# Patient Record
Sex: FEMALE | Race: BLACK OR AFRICAN AMERICAN | NOT HISPANIC OR LATINO | Employment: OTHER | URBAN - METROPOLITAN AREA
[De-identification: names, ages, dates, MRNs, and addresses within clinical notes are randomized per-mention and may not be internally consistent; named-entity substitution may affect disease eponyms.]

---

## 2021-07-13 ENCOUNTER — HOSPITAL ENCOUNTER (EMERGENCY)
Facility: HOSPITAL | Age: 61
Discharge: HOME | End: 2021-07-14
Attending: EMERGENCY MEDICINE
Payer: MEDICARE

## 2021-07-13 DIAGNOSIS — F23 ACUTE PSYCHOSIS: Primary | ICD-10-CM

## 2021-07-13 DIAGNOSIS — K59.00 CONSTIPATION: ICD-10-CM

## 2021-07-13 DIAGNOSIS — R45.850 HOMICIDAL IDEATIONS: ICD-10-CM

## 2021-07-13 LAB
ALBUMIN SERPL BCP-MCNC: 3.9 G/DL (ref 3.5–5.2)
ALP SERPL-CCNC: 82 U/L (ref 55–135)
ALT SERPL W/O P-5'-P-CCNC: 18 U/L (ref 10–44)
AMPHET+METHAMPHET UR QL: NEGATIVE
ANION GAP SERPL CALC-SCNC: 13 MMOL/L (ref 8–16)
APAP SERPL-MCNC: <3 UG/ML (ref 10–20)
AST SERPL-CCNC: 21 U/L (ref 10–40)
BARBITURATES UR QL SCN>200 NG/ML: NEGATIVE
BASOPHILS # BLD AUTO: 0.03 K/UL (ref 0–0.2)
BASOPHILS NFR BLD: 0.5 % (ref 0–1.9)
BENZODIAZ UR QL SCN>200 NG/ML: NEGATIVE
BILIRUB SERPL-MCNC: 0.3 MG/DL (ref 0.1–1)
BILIRUB UR QL STRIP: NEGATIVE
BUN SERPL-MCNC: 17 MG/DL (ref 8–23)
BZE UR QL SCN: NEGATIVE
CALCIUM SERPL-MCNC: 11 MG/DL (ref 8.7–10.5)
CANNABINOIDS UR QL SCN: NEGATIVE
CHLORIDE SERPL-SCNC: 101 MMOL/L (ref 95–110)
CLARITY UR: CLEAR
CO2 SERPL-SCNC: 26 MMOL/L (ref 23–29)
COLOR UR: YELLOW
CREAT SERPL-MCNC: 0.9 MG/DL (ref 0.5–1.4)
CREAT UR-MCNC: 83.5 MG/DL (ref 15–325)
DIFFERENTIAL METHOD: ABNORMAL
EOSINOPHIL # BLD AUTO: 0 K/UL (ref 0–0.5)
EOSINOPHIL NFR BLD: 0.2 % (ref 0–8)
ERYTHROCYTE [DISTWIDTH] IN BLOOD BY AUTOMATED COUNT: 13.1 % (ref 11.5–14.5)
EST. GFR  (AFRICAN AMERICAN): >60 ML/MIN/1.73 M^2
EST. GFR  (NON AFRICAN AMERICAN): >60 ML/MIN/1.73 M^2
ETHANOL SERPL-MCNC: <10 MG/DL
GLUCOSE SERPL-MCNC: 139 MG/DL (ref 70–110)
GLUCOSE UR QL STRIP: NEGATIVE
HCT VFR BLD AUTO: 41.4 % (ref 37–48.5)
HCV AB SERPL QL IA: NEGATIVE
HEP C VIRUS HOLD SPECIMEN: NORMAL
HGB BLD-MCNC: 13.6 G/DL (ref 12–16)
HGB UR QL STRIP: NEGATIVE
HIV 1+2 AB+HIV1 P24 AG SERPL QL IA: NEGATIVE
IMM GRANULOCYTES # BLD AUTO: 0.01 K/UL (ref 0–0.04)
IMM GRANULOCYTES NFR BLD AUTO: 0.2 % (ref 0–0.5)
KETONES UR QL STRIP: NEGATIVE
LEUKOCYTE ESTERASE UR QL STRIP: NEGATIVE
LYMPHOCYTES # BLD AUTO: 2.7 K/UL (ref 1–4.8)
LYMPHOCYTES NFR BLD: 48.4 % (ref 18–48)
MCH RBC QN AUTO: 28.6 PG (ref 27–31)
MCHC RBC AUTO-ENTMCNC: 32.9 G/DL (ref 32–36)
MCV RBC AUTO: 87 FL (ref 82–98)
METHADONE UR QL SCN>300 NG/ML: NEGATIVE
MONOCYTES # BLD AUTO: 0.6 K/UL (ref 0.3–1)
MONOCYTES NFR BLD: 10 % (ref 4–15)
NEUTROPHILS # BLD AUTO: 2.3 K/UL (ref 1.8–7.7)
NEUTROPHILS NFR BLD: 40.7 % (ref 38–73)
NITRITE UR QL STRIP: NEGATIVE
NRBC BLD-RTO: 0 /100 WBC
OPIATES UR QL SCN: NEGATIVE
PCP UR QL SCN>25 NG/ML: NEGATIVE
PH UR STRIP: 6 [PH] (ref 5–8)
PLATELET # BLD AUTO: 269 K/UL (ref 150–450)
PMV BLD AUTO: 9.7 FL (ref 9.2–12.9)
POTASSIUM SERPL-SCNC: 4.5 MMOL/L (ref 3.5–5.1)
PROT SERPL-MCNC: 8.9 G/DL (ref 6–8.4)
PROT UR QL STRIP: NEGATIVE
RBC # BLD AUTO: 4.75 M/UL (ref 4–5.4)
SODIUM SERPL-SCNC: 140 MMOL/L (ref 136–145)
SP GR UR STRIP: 1.02 (ref 1–1.03)
TOXICOLOGY INFORMATION: NORMAL
TSH SERPL DL<=0.005 MIU/L-ACNC: 1.72 UIU/ML (ref 0.4–4)
URN SPEC COLLECT METH UR: NORMAL
UROBILINOGEN UR STRIP-ACNC: NEGATIVE EU/DL
WBC # BLD AUTO: 5.62 K/UL (ref 3.9–12.7)

## 2021-07-13 PROCEDURE — 96372 THER/PROPH/DIAG INJ SC/IM: CPT

## 2021-07-13 PROCEDURE — 87389 HIV-1 AG W/HIV-1&-2 AB AG IA: CPT | Performed by: EMERGENCY MEDICINE

## 2021-07-13 PROCEDURE — 82077 ASSAY SPEC XCP UR&BREATH IA: CPT | Performed by: EMERGENCY MEDICINE

## 2021-07-13 PROCEDURE — 80307 DRUG TEST PRSMV CHEM ANLYZR: CPT | Performed by: EMERGENCY MEDICINE

## 2021-07-13 PROCEDURE — 80143 DRUG ASSAY ACETAMINOPHEN: CPT | Performed by: EMERGENCY MEDICINE

## 2021-07-13 PROCEDURE — 99285 EMERGENCY DEPT VISIT HI MDM: CPT | Mod: 25

## 2021-07-13 PROCEDURE — 63600175 PHARM REV CODE 636 W HCPCS: Performed by: EMERGENCY MEDICINE

## 2021-07-13 PROCEDURE — 80053 COMPREHEN METABOLIC PANEL: CPT | Performed by: EMERGENCY MEDICINE

## 2021-07-13 PROCEDURE — 81003 URINALYSIS AUTO W/O SCOPE: CPT | Mod: 59 | Performed by: EMERGENCY MEDICINE

## 2021-07-13 PROCEDURE — 25000003 PHARM REV CODE 250: Performed by: EMERGENCY MEDICINE

## 2021-07-13 PROCEDURE — 84443 ASSAY THYROID STIM HORMONE: CPT | Performed by: EMERGENCY MEDICINE

## 2021-07-13 PROCEDURE — 86803 HEPATITIS C AB TEST: CPT | Performed by: EMERGENCY MEDICINE

## 2021-07-13 PROCEDURE — 85025 COMPLETE CBC W/AUTO DIFF WBC: CPT | Performed by: EMERGENCY MEDICINE

## 2021-07-13 RX ORDER — DIVALPROEX SODIUM 500 MG/1
TABLET, FILM COATED, EXTENDED RELEASE ORAL
COMMUNITY
Start: 2021-06-09

## 2021-07-13 RX ORDER — ATORVASTATIN CALCIUM 40 MG/1
40 TABLET, FILM COATED ORAL DAILY
COMMUNITY
Start: 2021-06-17

## 2021-07-13 RX ORDER — METFORMIN HYDROCHLORIDE 1000 MG/1
1000 TABLET ORAL 2 TIMES DAILY
COMMUNITY
Start: 2021-07-08

## 2021-07-13 RX ORDER — ZIPRASIDONE MESYLATE 20 MG/ML
10 INJECTION, POWDER, LYOPHILIZED, FOR SOLUTION INTRAMUSCULAR
Status: COMPLETED | OUTPATIENT
Start: 2021-07-13 | End: 2021-07-13

## 2021-07-13 RX ORDER — HYDROXYZINE HYDROCHLORIDE 25 MG/1
25 TABLET, FILM COATED ORAL DAILY
COMMUNITY
Start: 2021-05-11

## 2021-07-13 RX ORDER — ONDANSETRON 4 MG/1
4 TABLET, ORALLY DISINTEGRATING ORAL
Status: COMPLETED | OUTPATIENT
Start: 2021-07-13 | End: 2021-07-13

## 2021-07-13 RX ORDER — HYDROCHLOROTHIAZIDE 12.5 MG/1
TABLET ORAL
COMMUNITY
Start: 2021-06-09

## 2021-07-13 RX ORDER — BLOOD-GLUCOSE METER
EACH MISCELLANEOUS
COMMUNITY
Start: 2021-05-13

## 2021-07-13 RX ORDER — METOCLOPRAMIDE 10 MG/1
TABLET ORAL
COMMUNITY
Start: 2021-04-13

## 2021-07-13 RX ORDER — TRIAMCINOLONE ACETONIDE 1 MG/G
CREAM TOPICAL
COMMUNITY
Start: 2021-05-11

## 2021-07-13 RX ORDER — AMLODIPINE BESYLATE 10 MG/1
TABLET ORAL
COMMUNITY
Start: 2021-07-03

## 2021-07-13 RX ORDER — FLUTICASONE PROPIONATE 50 MCG
SPRAY, SUSPENSION (ML) NASAL
COMMUNITY
Start: 2021-06-17

## 2021-07-13 RX ORDER — MONTELUKAST SODIUM 10 MG/1
10 TABLET ORAL DAILY
COMMUNITY
Start: 2021-06-14

## 2021-07-13 RX ORDER — CARVEDILOL 6.25 MG/1
6.25 TABLET ORAL 2 TIMES DAILY
COMMUNITY
Start: 2021-04-26

## 2021-07-13 RX ORDER — ALBUTEROL SULFATE 90 UG/1
AEROSOL, METERED RESPIRATORY (INHALATION)
COMMUNITY
Start: 2021-05-28

## 2021-07-13 RX ORDER — NAPROXEN SODIUM 220 MG/1
TABLET, FILM COATED ORAL
COMMUNITY

## 2021-07-13 RX ADMIN — ZIPRASIDONE MESYLATE 10 MG: 20 INJECTION, POWDER, LYOPHILIZED, FOR SOLUTION INTRAMUSCULAR at 06:07

## 2021-07-13 RX ADMIN — ONDANSETRON 4 MG: 4 TABLET, ORALLY DISINTEGRATING ORAL at 06:07

## 2021-07-14 VITALS
WEIGHT: 132.25 LBS | DIASTOLIC BLOOD PRESSURE: 70 MMHG | HEART RATE: 77 BPM | OXYGEN SATURATION: 98 % | TEMPERATURE: 98 F | SYSTOLIC BLOOD PRESSURE: 146 MMHG | RESPIRATION RATE: 16 BRPM

## 2023-05-26 ENCOUNTER — HOSPITAL ENCOUNTER (EMERGENCY)
Facility: HOSPITAL | Age: 63
Discharge: PSYCHIATRIC OR SUBSTANCE USE FACILITY | End: 2023-05-27
Attending: EMERGENCY MEDICINE
Payer: MEDICARE

## 2023-05-26 DIAGNOSIS — R10.31 RIGHT LOWER QUADRANT ABDOMINAL PAIN: ICD-10-CM

## 2023-05-26 DIAGNOSIS — R45.1 AGITATION: ICD-10-CM

## 2023-05-26 DIAGNOSIS — F45.8 DELUSION OF PREGNANCY: Primary | ICD-10-CM

## 2023-05-26 LAB
ALBUMIN SERPL BCP-MCNC: 4.4 G/DL (ref 3.5–5.2)
ALP SERPL-CCNC: 108 U/L (ref 55–135)
ALT SERPL W/O P-5'-P-CCNC: 19 U/L (ref 10–44)
AMPHET+METHAMPHET UR QL: NEGATIVE
ANION GAP SERPL CALC-SCNC: 15 MMOL/L (ref 8–16)
APAP SERPL-MCNC: <3 UG/ML (ref 10–20)
AST SERPL-CCNC: 27 U/L (ref 10–40)
B-HCG UR QL: NEGATIVE
BARBITURATES UR QL SCN>200 NG/ML: NEGATIVE
BASOPHILS # BLD AUTO: 0.04 K/UL (ref 0–0.2)
BASOPHILS NFR BLD: 0.6 % (ref 0–1.9)
BENZODIAZ UR QL SCN>200 NG/ML: NEGATIVE
BILIRUB SERPL-MCNC: 0.4 MG/DL (ref 0.1–1)
BUN SERPL-MCNC: 22 MG/DL (ref 8–23)
BZE UR QL SCN: NEGATIVE
CALCIUM SERPL-MCNC: 9.9 MG/DL (ref 8.7–10.5)
CANNABINOIDS UR QL SCN: NEGATIVE
CHLORIDE SERPL-SCNC: 106 MMOL/L (ref 95–110)
CO2 SERPL-SCNC: 23 MMOL/L (ref 23–29)
CREAT SERPL-MCNC: 1.1 MG/DL (ref 0.5–1.4)
CREAT UR-MCNC: 435.9 MG/DL (ref 15–325)
DIFFERENTIAL METHOD: NORMAL
EOSINOPHIL # BLD AUTO: 0 K/UL (ref 0–0.5)
EOSINOPHIL NFR BLD: 0.3 % (ref 0–8)
ERYTHROCYTE [DISTWIDTH] IN BLOOD BY AUTOMATED COUNT: 13.1 % (ref 11.5–14.5)
EST. GFR  (NO RACE VARIABLE): 56 ML/MIN/1.73 M^2
ETHANOL SERPL-MCNC: <10 MG/DL
GLUCOSE SERPL-MCNC: 93 MG/DL (ref 70–110)
HCT VFR BLD AUTO: 44 % (ref 37–48.5)
HCV AB SERPL QL IA: NEGATIVE
HEP C VIRUS HOLD SPECIMEN: NORMAL
HGB BLD-MCNC: 14.3 G/DL (ref 12–16)
HIV 1+2 AB+HIV1 P24 AG SERPL QL IA: NEGATIVE
IMM GRANULOCYTES # BLD AUTO: 0.02 K/UL (ref 0–0.04)
IMM GRANULOCYTES NFR BLD AUTO: 0.3 % (ref 0–0.5)
LIPASE SERPL-CCNC: 22 U/L (ref 4–60)
LYMPHOCYTES # BLD AUTO: 2.4 K/UL (ref 1–4.8)
LYMPHOCYTES NFR BLD: 34.3 % (ref 18–48)
MCH RBC QN AUTO: 27.6 PG (ref 27–31)
MCHC RBC AUTO-ENTMCNC: 32.5 G/DL (ref 32–36)
MCV RBC AUTO: 85 FL (ref 82–98)
METHADONE UR QL SCN>300 NG/ML: NEGATIVE
MONOCYTES # BLD AUTO: 0.6 K/UL (ref 0.3–1)
MONOCYTES NFR BLD: 8.8 % (ref 4–15)
NEUTROPHILS # BLD AUTO: 3.9 K/UL (ref 1.8–7.7)
NEUTROPHILS NFR BLD: 55.7 % (ref 38–73)
NRBC BLD-RTO: 0 /100 WBC
OPIATES UR QL SCN: NEGATIVE
PCP UR QL SCN>25 NG/ML: NEGATIVE
PLATELET # BLD AUTO: 260 K/UL (ref 150–450)
PMV BLD AUTO: 10.1 FL (ref 9.2–12.9)
POTASSIUM SERPL-SCNC: 3.5 MMOL/L (ref 3.5–5.1)
PROT SERPL-MCNC: 8.4 G/DL (ref 6–8.4)
RBC # BLD AUTO: 5.18 M/UL (ref 4–5.4)
SARS-COV-2 RDRP RESP QL NAA+PROBE: NEGATIVE
SODIUM SERPL-SCNC: 144 MMOL/L (ref 136–145)
TOXICOLOGY INFORMATION: ABNORMAL
TSH SERPL DL<=0.005 MIU/L-ACNC: 1.11 UIU/ML (ref 0.4–4)
WBC # BLD AUTO: 6.93 K/UL (ref 3.9–12.7)

## 2023-05-26 PROCEDURE — 80143 DRUG ASSAY ACETAMINOPHEN: CPT | Performed by: EMERGENCY MEDICINE

## 2023-05-26 PROCEDURE — 82077 ASSAY SPEC XCP UR&BREATH IA: CPT | Performed by: EMERGENCY MEDICINE

## 2023-05-26 PROCEDURE — 81000 URINALYSIS NONAUTO W/SCOPE: CPT | Mod: 59 | Performed by: EMERGENCY MEDICINE

## 2023-05-26 PROCEDURE — 87389 HIV-1 AG W/HIV-1&-2 AB AG IA: CPT | Performed by: EMERGENCY MEDICINE

## 2023-05-26 PROCEDURE — 25500020 PHARM REV CODE 255: Performed by: EMERGENCY MEDICINE

## 2023-05-26 PROCEDURE — 84443 ASSAY THYROID STIM HORMONE: CPT | Performed by: EMERGENCY MEDICINE

## 2023-05-26 PROCEDURE — 80053 COMPREHEN METABOLIC PANEL: CPT | Performed by: EMERGENCY MEDICINE

## 2023-05-26 PROCEDURE — 85025 COMPLETE CBC W/AUTO DIFF WBC: CPT | Performed by: EMERGENCY MEDICINE

## 2023-05-26 PROCEDURE — U0002 COVID-19 LAB TEST NON-CDC: HCPCS | Performed by: EMERGENCY MEDICINE

## 2023-05-26 PROCEDURE — 99285 EMERGENCY DEPT VISIT HI MDM: CPT | Mod: 25

## 2023-05-26 PROCEDURE — 25000003 PHARM REV CODE 250: Performed by: EMERGENCY MEDICINE

## 2023-05-26 PROCEDURE — 83690 ASSAY OF LIPASE: CPT | Performed by: EMERGENCY MEDICINE

## 2023-05-26 PROCEDURE — 86803 HEPATITIS C AB TEST: CPT | Performed by: EMERGENCY MEDICINE

## 2023-05-26 PROCEDURE — 81025 URINE PREGNANCY TEST: CPT | Performed by: EMERGENCY MEDICINE

## 2023-05-26 PROCEDURE — 80307 DRUG TEST PRSMV CHEM ANLYZR: CPT | Performed by: EMERGENCY MEDICINE

## 2023-05-26 RX ORDER — OLANZAPINE 5 MG/1
10 TABLET, ORALLY DISINTEGRATING ORAL
Status: COMPLETED | OUTPATIENT
Start: 2023-05-26 | End: 2023-05-26

## 2023-05-26 RX ADMIN — IOHEXOL 100 ML: 350 INJECTION, SOLUTION INTRAVENOUS at 09:05

## 2023-05-26 RX ADMIN — OLANZAPINE 10 MG: 5 TABLET, ORALLY DISINTEGRATING ORAL at 08:05

## 2023-05-27 VITALS
DIASTOLIC BLOOD PRESSURE: 77 MMHG | HEART RATE: 81 BPM | RESPIRATION RATE: 18 BRPM | HEIGHT: 59 IN | BODY MASS INDEX: 26.72 KG/M2 | TEMPERATURE: 99 F | OXYGEN SATURATION: 97 % | SYSTOLIC BLOOD PRESSURE: 135 MMHG

## 2023-05-27 LAB
BACTERIA #/AREA URNS HPF: ABNORMAL /HPF
BILIRUB UR QL STRIP: ABNORMAL
CLARITY UR: CLEAR
COLOR UR: YELLOW
GLUCOSE UR QL STRIP: ABNORMAL
HGB UR QL STRIP: NEGATIVE
HYALINE CASTS #/AREA URNS LPF: 2 /LPF
KETONES UR QL STRIP: ABNORMAL
LEUKOCYTE ESTERASE UR QL STRIP: NEGATIVE
MICROSCOPIC COMMENT: ABNORMAL
NITRITE UR QL STRIP: NEGATIVE
PH UR STRIP: 6 [PH] (ref 5–8)
PROT UR QL STRIP: ABNORMAL
RBC #/AREA URNS HPF: 1 /HPF (ref 0–4)
SP GR UR STRIP: >1.03 (ref 1–1.03)
SQUAMOUS #/AREA URNS HPF: 1 /HPF
URN SPEC COLLECT METH UR: ABNORMAL
UROBILINOGEN UR STRIP-ACNC: ABNORMAL EU/DL
WBC #/AREA URNS HPF: 1 /HPF (ref 0–5)

## 2023-05-27 PROCEDURE — 25000003 PHARM REV CODE 250: Performed by: EMERGENCY MEDICINE

## 2023-05-27 PROCEDURE — 63600175 PHARM REV CODE 636 W HCPCS: Performed by: EMERGENCY MEDICINE

## 2023-05-27 PROCEDURE — 96372 THER/PROPH/DIAG INJ SC/IM: CPT | Performed by: EMERGENCY MEDICINE

## 2023-05-27 RX ORDER — LORAZEPAM 2 MG/ML
1 INJECTION INTRAMUSCULAR
Status: COMPLETED | OUTPATIENT
Start: 2023-05-27 | End: 2023-05-27

## 2023-05-27 RX ORDER — OLANZAPINE 5 MG/1
5 TABLET, ORALLY DISINTEGRATING ORAL
Status: COMPLETED | OUTPATIENT
Start: 2023-05-27 | End: 2023-05-27

## 2023-05-27 RX ORDER — ZIPRASIDONE MESYLATE 20 MG/ML
20 INJECTION, POWDER, LYOPHILIZED, FOR SOLUTION INTRAMUSCULAR
Status: COMPLETED | OUTPATIENT
Start: 2023-05-27 | End: 2023-05-27

## 2023-05-27 RX ORDER — DIPHENHYDRAMINE HYDROCHLORIDE 50 MG/ML
25 INJECTION INTRAMUSCULAR; INTRAVENOUS
Status: DISCONTINUED | OUTPATIENT
Start: 2023-05-27 | End: 2023-05-27 | Stop reason: HOSPADM

## 2023-05-27 RX ORDER — LORAZEPAM 2 MG/ML
2 INJECTION INTRAMUSCULAR
Status: DISCONTINUED | OUTPATIENT
Start: 2023-05-27 | End: 2023-05-27 | Stop reason: HOSPADM

## 2023-05-27 RX ADMIN — OLANZAPINE 5 MG: 5 TABLET, ORALLY DISINTEGRATING ORAL at 04:05

## 2023-05-27 RX ADMIN — LORAZEPAM 1 MG: 2 INJECTION INTRAMUSCULAR; INTRAVENOUS at 12:05

## 2023-05-27 RX ADMIN — ZIPRASIDONE MESYLATE 20 MG: 20 INJECTION, POWDER, LYOPHILIZED, FOR SOLUTION INTRAMUSCULAR at 06:05

## 2023-05-27 NOTE — ED NOTES
Bed: 21  Expected date: 5/26/23  Expected time: 7:50 PM  Means of arrival: Ambulance Service  Comments:
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
## 2024-05-29 ENCOUNTER — TELEPHONE (OUTPATIENT)
Dept: OBSTETRICS AND GYNECOLOGY | Facility: CLINIC | Age: 64
End: 2024-05-29
Payer: MEDICARE

## 2024-05-29 NOTE — TELEPHONE ENCOUNTER
Lvm for pt to return call to get appointment scheduled with our department.   Received referral for annual/STI testing.

## 2025-04-08 ENCOUNTER — OFFICE VISIT (OUTPATIENT)
Dept: SPORTS MEDICINE | Facility: CLINIC | Age: 65
End: 2025-04-08
Payer: MEDICARE

## 2025-04-08 ENCOUNTER — HOSPITAL ENCOUNTER (OUTPATIENT)
Dept: RADIOLOGY | Facility: HOSPITAL | Age: 65
Discharge: HOME | End: 2025-04-08
Attending: ORTHOPAEDIC SURGERY
Payer: MEDICARE

## 2025-04-08 DIAGNOSIS — G56.03 BILATERAL CARPAL TUNNEL SYNDROME: ICD-10-CM

## 2025-04-08 DIAGNOSIS — M67.432 GANGLION CYST OF DORSUM OF LEFT WRIST: ICD-10-CM

## 2025-04-08 DIAGNOSIS — M25.532 LEFT WRIST PAIN: Primary | ICD-10-CM

## 2025-04-08 DIAGNOSIS — M25.532 LEFT WRIST PAIN: ICD-10-CM

## 2025-04-08 DIAGNOSIS — M25.832 ULNAR IMPACTION SYNDROME, LEFT: ICD-10-CM

## 2025-04-08 PROCEDURE — 73110 X-RAY EXAM OF WRIST: CPT | Mod: 26,LT,, | Performed by: RADIOLOGY

## 2025-04-08 PROCEDURE — 73110 X-RAY EXAM OF WRIST: CPT | Mod: TC,PN,LT

## 2025-04-08 PROCEDURE — 99999 PR PBB SHADOW E&M-EST. PATIENT-LVL II: CPT | Mod: PBBFAC,,, | Performed by: ORTHOPAEDIC SURGERY

## 2025-04-08 RX ORDER — BETAMETHASONE SODIUM PHOSPHATE AND BETAMETHASONE ACETATE 3; 3 MG/ML; MG/ML
6 INJECTION, SUSPENSION INTRA-ARTICULAR; INTRALESIONAL; INTRAMUSCULAR; SOFT TISSUE
Status: DISCONTINUED | OUTPATIENT
Start: 2025-04-08 | End: 2025-04-08 | Stop reason: HOSPADM

## 2025-04-08 RX ADMIN — BETAMETHASONE SODIUM PHOSPHATE AND BETAMETHASONE ACETATE 6 MG: 3; 3 INJECTION, SUSPENSION INTRA-ARTICULAR; INTRALESIONAL; INTRAMUSCULAR; SOFT TISSUE at 11:04

## 2025-04-08 NOTE — ASSESSMENT & PLAN NOTE
The patient and I talked at length about the natural history and pathophysiology of carpal tunnel, she understands that this is a chronic problem which may have acute episodic exacerbations.   Symptoms may resolve, worsen and even become permanent.  The patient understands the treatment options including observation, activity modification, therapy, NSAIDs, splints, injections and the surgical options including carpal tunnel release.  We discussed the risks of the diagnosis and the treatment options including pain, infection, bleeding, damage to nerves and vessels, stiffness, scarring, incomplete relief or recurrence of symptoms, poor pain and functional outcomes.  Unique risks of this diagnosis and the treatment include recurrence and incomplete relief of symptoms.  The patients treatment is further complicated by an increased risk of stiffness, wound healing complications and infection due to diabetes and an elevation of blood glucose levels in diabetic patients after steroid injections.  The patient has elected to proceed with night splints and bilateral carpal tunnel injections and we will follow up in 3 months.

## 2025-04-08 NOTE — ASSESSMENT & PLAN NOTE
The patient and I talked at length about the natural history and pathophysiology of left dorsal carpal ganglion cyst, she understands that this is a chronic problem which may have acute episodic exacerbations.   Symptoms may resolve, worsen and even become permanent.  The patient understands the treatment options including observation, activity modification, therapy, NSAIDs, splints, injections and the surgical options including excision.  We discussed the risks of the diagnosis and the treatment options including pain, infection, bleeding, damage to nerves and vessels, stiffness, scarring, incomplete relief or recurrence of symptoms, poor pain and functional outcomes.  Unique risks of this diagnosis and the treatment include recurrence.  The patients treatment is further complicated by an increased risk of stiffness, wound healing complications and infection due to diabetes.  The patient has elected to proceed with observation and bracing and we will follow up 3 months.

## 2025-04-08 NOTE — ASSESSMENT & PLAN NOTE
The patient and I talked at length about the natural history and pathophysiology of left ulnar impaction syndrome, she understands that this is a chronic problem which may have acute episodic exacerbations.   Symptoms may resolve, worsen and even become permanent.  The patient understands the treatment options including observation, activity modification, therapy, NSAIDs, splints, injections and the surgical options including ulnar shortening osteotomy.  We discussed the risks of the diagnosis and the treatment options including pain, infection, bleeding, damage to nerves and vessels, stiffness, scarring, incomplete relief or recurrence of symptoms, poor pain and functional outcomes.  Unique risks of this diagnosis and the treatment include nonunion.  The patients treatment is further complicated by an increased risk of stiffness, wound healing complications and infection due to diabetes.  The patient has elected to proceed with wrist widget splint and we will follow up three-month.

## 2025-04-08 NOTE — PROGRESS NOTES
LYNDA Holden M.D.  Orthopaedic Hand and Wrist Surgery  [facility]    Patient ID: Linda Gonzalez  YOB: 1960  MRN: [id number]    Provider Note/Medical Decision Makin. Left wrist pain  -     X-Ray Wrist Complete Left; Future; Expected date: 2025  -     Carpal Tunnel: L carpal tunnel    2. Bilateral carpal tunnel syndrome  Assessment & Plan:  The patient and I talked at length about the natural history and pathophysiology of carpal tunnel, she understands that this is a chronic problem which may have acute episodic exacerbations.   Symptoms may resolve, worsen and even become permanent.  The patient understands the treatment options including observation, activity modification, therapy, NSAIDs, splints, injections and the surgical options including carpal tunnel release.  We discussed the risks of the diagnosis and the treatment options including pain, infection, bleeding, damage to nerves and vessels, stiffness, scarring, incomplete relief or recurrence of symptoms, poor pain and functional outcomes.  Unique risks of this diagnosis and the treatment include recurrence and incomplete relief of symptoms.  The patients treatment is further complicated by an increased risk of stiffness, wound healing complications and infection due to diabetes and an elevation of blood glucose levels in diabetic patients after steroid injections.  The patient has elected to proceed with night splints and bilateral carpal tunnel injections and we will follow up in 3 months.      Orders:  -     Carpal Tunnel: R carpal tunnel    3. Ganglion cyst of dorsum of left wrist  Assessment & Plan:  The patient and I talked at length about the natural history and pathophysiology of left dorsal carpal ganglion cyst, she understands that this is a chronic problem which may have acute episodic exacerbations.   Symptoms may resolve, worsen and even become permanent.  The patient understands the treatment  options including observation, activity modification, therapy, NSAIDs, splints, injections and the surgical options including excision.  We discussed the risks of the diagnosis and the treatment options including pain, infection, bleeding, damage to nerves and vessels, stiffness, scarring, incomplete relief or recurrence of symptoms, poor pain and functional outcomes.  Unique risks of this diagnosis and the treatment include recurrence.  The patients treatment is further complicated by an increased risk of stiffness, wound healing complications and infection due to diabetes.  The patient has elected to proceed with observation and bracing and we will follow up 3 months.        4. Ulnar impaction syndrome, left  Assessment & Plan:  The patient and I talked at length about the natural history and pathophysiology of left ulnar impaction syndrome, she understands that this is a chronic problem which may have acute episodic exacerbations.   Symptoms may resolve, worsen and even become permanent.  The patient understands the treatment options including observation, activity modification, therapy, NSAIDs, splints, injections and the surgical options including ulnar shortening osteotomy.  We discussed the risks of the diagnosis and the treatment options including pain, infection, bleeding, damage to nerves and vessels, stiffness, scarring, incomplete relief or recurrence of symptoms, poor pain and functional outcomes.  Unique risks of this diagnosis and the treatment include nonunion.  The patients treatment is further complicated by an increased risk of stiffness, wound healing complications and infection due to diabetes.  The patient has elected to proceed with wrist widget splint and we will follow up three-month.             Chief Complaint: Pain of the Left Wrist      Referred By: Self,Aaareferral    History of Present Illness: Linda Gonzalez is a 64 y.o. female with a several year history of bilateral hand numbness  "and tingling I have seen her previously she did have an EMG in  which confirmed bilateral carpal tunnel syndrome.  She also has a 5 day history of left dorsal wrist mass and a several week history of left ulnar-sided wrist pain with activity.  She denies any injury.      Patient was queried and this is the extent of the patients current complaints today.    Past Medical History:     Estimated body mass index is 26.72 kg/m² as calculated from the following:    Height as of 23: 4' 11" (1.499 m).    Weight as of 21: 60 kg (132 lb 4.4 oz).  Past Medical History:   Diagnosis Date    Bipolar 1 disorder     Schizophrenia      No past surgical history on file.  No family history on file.  Social History[1]  Medication List with Changes/Refills   Current Medications    ACCU-CHEK GUIDE ME GLUCOSE MTR MISC        ALBUTEROL (PROVENTIL/VENTOLIN HFA) 90 MCG/ACTUATION INHALER    Inhale into the lungs.    AMLODIPINE (NORVASC) 10 MG TABLET        ASPIRIN 81 MG CHEW    CHEW THEN SWALLOW 1 TABLET BY MOUTH DAILY    ATORVASTATIN (LIPITOR) 40 MG TABLET    Take 40 mg by mouth once daily.    CARVEDILOL (COREG) 6.25 MG TABLET    Take 6.25 mg by mouth 2 (two) times daily.    DIVALPROEX ER (DEPAKOTE) 500 MG TB24        FLUTICASONE PROPIONATE (FLONASE) 50 MCG/ACTUATION NASAL SPRAY    by Each Nostril route.    HYDROCHLOROTHIAZIDE (HYDRODIURIL) 12.5 MG TAB        HYDROXYZINE HCL (ATARAX) 25 MG TABLET    Take 25 mg by mouth once daily.    METFORMIN (GLUCOPHAGE) 1000 MG TABLET    Take 1,000 mg by mouth 2 (two) times daily.    METOCLOPRAMIDE HCL (REGLAN) 10 MG TABLET    Take by mouth.    MONTELUKAST (SINGULAIR) 10 MG TABLET    Take 10 mg by mouth once daily.    TRIAMCINOLONE ACETONIDE 0.1% (KENALOG) 0.1 % CREAM    SMARTSI Application Topical 2-3 Times Daily     Review of patient's allergies indicates:   Allergen Reactions    Carbamazepine Hives     Other reaction(s): Sensitivity: AL;      Fluphenazine hcl Hives     Other " reaction(s): Sensitivity: AL;      Haloperidol lactate Hives     Other reaction(s): Sensitivity: AL;      Lithium analogues Hives     Other reaction(s): Sensitivity: AL;      Milk containing products (dairy) Diarrhea     Other reaction(s): Unknown      Lurasidone Hives and Rash     ROS    Physical Exam:   GENERAL: Well appearing, appropriate for stated age, no acute distress.  CARDIOVASCULAR:  Fingers have good brisk refill and good turgor.   PULMONARY: Respirations are even and non-labored.  NEURO: Awake, alert, and oriented x 3.  PSYCH: Mood & affect are appropriate.              Right Hand/Wrist Exam     Range of Motion   The patient has normal right hand/wrist ROM.    Tests   Tinel's sign (median nerve): positive  Carpal Tunnel Compression Test: positive        Left Hand/Wrist Exam     Tests   Carpal Tunnel Compression Test: positive            Hand/Wrist Musculoskeletal Exam    Inspection    Right      Right hand/wrist inspection is normal.    Left      Left hand/wrist inspection is normal.    Palpation    Right      Right hand palpation is normal.    Left       Left hand palpation is normal.    Palpation additional comments: There is a left dorsal wrist mass consistent with a ganglion cyst  Mild tenderness over the TFC  No tenderness over the ECU or FCU    Range of Motion      Right Wrist      Right wrist range of motion is normal.       Strength    Right Hand      Abductor pollicis brevis: 5/5.       1st dorsal interossei abduction: 5/5.      Left Hand      Abductor pollicis brevis: 5/5.       1st dorsal interossei abduction: 5/5.        Neurovascular    Right       Ulnar nerve sensory distribution: normal      Median nerve sensory distribution: decreased      Superficial radial nerve sensory distribution: normal    Left       Ulnar nerve sensory distribution: normal      Median nerve sensory distribution: decreased      Superficial radial nerve sensory distribution: normal    Special Tests    Right       Carpal compression test: positive      Elbow flexion: negative      Tinel's - carpal tunnel: positive    Left      Carpal compression test: positive    Full range of motion of both hands  Pain over the TFC      Imaging:    Relevant imaging results reviewed and interpreted by me, discussed with the patient and / or family today.   X-rays reviewed which showed evidence of ulnar positivity and sclerosis with cystic formation over the proximal aspect of the lunate along the ulnar side consistent with ulnar impaction syndrome      Provider Note/Medical Decision Makin. Left wrist pain  -     X-Ray Wrist Complete Left; Future; Expected date: 2025  -     Carpal Tunnel: L carpal tunnel    2. Bilateral carpal tunnel syndrome  Assessment & Plan:  The patient and I talked at length about the natural history and pathophysiology of carpal tunnel, she understands that this is a chronic problem which may have acute episodic exacerbations.   Symptoms may resolve, worsen and even become permanent.  The patient understands the treatment options including observation, activity modification, therapy, NSAIDs, splints, injections and the surgical options including carpal tunnel release.  We discussed the risks of the diagnosis and the treatment options including pain, infection, bleeding, damage to nerves and vessels, stiffness, scarring, incomplete relief or recurrence of symptoms, poor pain and functional outcomes.  Unique risks of this diagnosis and the treatment include recurrence and incomplete relief of symptoms.  The patients treatment is further complicated by an increased risk of stiffness, wound healing complications and infection due to diabetes and an elevation of blood glucose levels in diabetic patients after steroid injections.  The patient has elected to proceed with night splints and bilateral carpal tunnel injections and we will follow up in 3 months.      Orders:  -     Carpal Tunnel: R carpal tunnel    3.  Ganglion cyst of dorsum of left wrist  Assessment & Plan:  The patient and I talked at length about the natural history and pathophysiology of left dorsal carpal ganglion cyst, she understands that this is a chronic problem which may have acute episodic exacerbations.   Symptoms may resolve, worsen and even become permanent.  The patient understands the treatment options including observation, activity modification, therapy, NSAIDs, splints, injections and the surgical options including excision.  We discussed the risks of the diagnosis and the treatment options including pain, infection, bleeding, damage to nerves and vessels, stiffness, scarring, incomplete relief or recurrence of symptoms, poor pain and functional outcomes.  Unique risks of this diagnosis and the treatment include recurrence.  The patients treatment is further complicated by an increased risk of stiffness, wound healing complications and infection due to diabetes.  The patient has elected to proceed with observation and bracing and we will follow up 3 months.        4. Ulnar impaction syndrome, left  Assessment & Plan:  The patient and I talked at length about the natural history and pathophysiology of left ulnar impaction syndrome, she understands that this is a chronic problem which may have acute episodic exacerbations.   Symptoms may resolve, worsen and even become permanent.  The patient understands the treatment options including observation, activity modification, therapy, NSAIDs, splints, injections and the surgical options including ulnar shortening osteotomy.  We discussed the risks of the diagnosis and the treatment options including pain, infection, bleeding, damage to nerves and vessels, stiffness, scarring, incomplete relief or recurrence of symptoms, poor pain and functional outcomes.  Unique risks of this diagnosis and the treatment include nonunion.  The patients treatment is further complicated by an increased risk of stiffness,  wound healing complications and infection due to diabetes.  The patient has elected to proceed with wrist widget splint and we will follow up three-month.             I discussed worrisome and red flag signs and symptoms with the patient. The patient expressed understanding and agreed to alert me immediately or to go to the emergency room if they experience any of these.   Treatment plan was developed with input from the patient/family, and they expressed understanding and agreement with the plan. All questions were answered today.    There are no Patient Instructions on file for this visit.    LYNDA Holden M.D.  [facility]  Orthopedic Hand and Wrist Surgeon    [facility]  Dr. Wale Holden   [facility]     Disclaimer: This note was prepared using a voice recognition system and is likely to have sound alike errors within the text.            [1]   Social History  Socioeconomic History    Marital status: Legally    Tobacco Use    Smoking status: Never    Smokeless tobacco: Never   Substance and Sexual Activity    Alcohol use: Not Currently    Drug use: Never    Sexual activity: Not Currently     Social Drivers of Health     Financial Resource Strain: High Risk (10/15/2024)    Received from [facility] SDOH Screening     In the past year, have you been unable to get any of the following when you really needed them? choose all that apply.: Medicine or health care     In the past year, have you been unable to get any of the following when you really needed them? choose all that apply.: Internet   Food Insecurity: No Food Insecurity (3/7/2024)    Received from [facility] and Its Subsidiaries and Affiliates    Hunger Vital Sign     Worried About Running Out of Food in the Last Year: Never true     Ran Out of Food in the Last Year: Never true   Transportation Needs: High Risk  (10/15/2024)    Received from [facility] SDOH Screening     Has lack of transportation kept you from medical appointments, meetings, work or from getting things needed for daily living? choose all that apply.: Yes, it has kept me from non-medical meetings, appointments, work or from getting things that i need   Stress: Patient Declined (1/19/2024)    Received from [facility] and Its Subsidiaries and Affiliates    [employer] [place] [employer] - Occupational Stress Questionnaire     Feeling of Stress : Patient declined   Housing Stability: Low Risk  (3/7/2024)    Received from [facility] and Its Subsidiaries and Affiliates    Housing Stability Vital Sign     Unable to Pay for Housing in the Last Year: No     Number of Places Lived in the Last Year: 1     Unstable Housing in the Last Year: No

## 2025-04-08 NOTE — PROCEDURES
Carpal Tunnel: R carpal tunnel    Date/Time: 4/8/2025 11:15 AM    Performed by: Alhaji Holden MD  Authorized by: Alhaji Holden MD    Consent Done?:  Yes (Verbal)  Indications:  Pain  Site marked: the procedure site was marked    Timeout: prior to procedure the correct patient, procedure, and site was verified    Prep: patient was prepped and draped in usual sterile fashion      Local anesthesia used?: Yes    Local anesthetic:  Lidocaine 1% without epinephrine  Anesthetic total (ml):  1    Location:  Wrist  Site:  R carpal tunnel  Ultrasonic Guidance for Needle Placement?: No    Approach:  Volar  Medications:  6 mg betamethasone acetate-betamethasone sodium phosphate 6 mg/mL  Patient tolerance:  Patient tolerated the procedure well with no immediate complications     Additional Comments: I have explained the risks, benefits, and alternatives of the procedure in detail.  The patient voices understanding and all questions have been answered.  The patient agrees to proceed as planned. After sterile prep the right Carpal Tunnel was injected while being aware of the relevant neurovascular structures with 6mg celestone and 1mL of 1% lidocaine with a 27g needle. The patient tolerated the injection well. The patient understands that immediate relief of pain is secondary to the local anesthetic and will be temporary.  After the anesthetic wears off there may be a increase in pain that may last for a few hours or a few days and they should use ice to help alleviate this flair up of pain.

## 2025-04-08 NOTE — PROCEDURES
Carpal Tunnel: L carpal tunnel    Date/Time: 4/8/2025 11:15 AM    Performed by: Alhaji Holden MD  Authorized by: Alhaji Holden MD    Consent Done?:  Yes (Verbal)  Indications:  Pain  Site marked: the procedure site was marked    Timeout: prior to procedure the correct patient, procedure, and site was verified    Prep: patient was prepped and draped in usual sterile fashion      Local anesthesia used?: Yes    Local anesthetic:  Lidocaine 1% without epinephrine  Anesthetic total (ml):  1    Location:  Wrist  Site:  L carpal tunnel  Ultrasonic Guidance for Needle Placement?: No    Approach:  Volar  Medications:  6 mg betamethasone acetate-betamethasone sodium phosphate 6 mg/mL  Patient tolerance:  Patient tolerated the procedure well with no immediate complications     Additional Comments: I have explained the risks, benefits, and alternatives of the procedure in detail.  The patient voices understanding and all questions have been answered.  The patient agrees to proceed as planned. After sterile prep the left Carpal Tunnel was injected while being aware of the relevant neurovascular structures with 6mg celestone and 1mL of 1% lidocaine with a 27g needle. The patient tolerated the injection well. The patient understands that immediate relief of pain is secondary to the local anesthetic and will be temporary.  After the anesthetic wears off there may be a increase in pain that may last for a few hours or a few days and they should use ice to help alleviate this flair up of pain.

## 2025-06-09 ENCOUNTER — HOSPITAL ENCOUNTER (EMERGENCY)
Facility: HOSPITAL | Age: 65
Discharge: PSYCHIATRIC OR SUBSTANCE USE FACILITY | End: 2025-06-10
Attending: EMERGENCY MEDICINE
Payer: MEDICARE

## 2025-06-09 DIAGNOSIS — Z00.8 MEDICAL CLEARANCE FOR PSYCHIATRIC ADMISSION: ICD-10-CM

## 2025-06-09 DIAGNOSIS — F22 PARANOID DELUSION: Primary | ICD-10-CM

## 2025-06-09 LAB
ABSOLUTE EOSINOPHIL (OHS): 0.03 K/UL
ABSOLUTE MONOCYTE (OHS): 0.31 K/UL (ref 0.3–1)
ABSOLUTE NEUTROPHIL COUNT (OHS): 1.35 K/UL (ref 1.8–7.7)
ALBUMIN SERPL BCP-MCNC: 3.5 G/DL (ref 3.5–5.2)
ALP SERPL-CCNC: 92 UNIT/L (ref 40–150)
ALT SERPL W/O P-5'-P-CCNC: 12 UNIT/L (ref 10–44)
ANION GAP (OHS): 10 MMOL/L (ref 8–16)
APAP SERPL-MCNC: <3 UG/ML (ref 10–20)
AST SERPL-CCNC: 15 UNIT/L (ref 11–45)
BACTERIA #/AREA URNS AUTO: NORMAL /HPF
BASOPHILS # BLD AUTO: 0.01 K/UL
BASOPHILS NFR BLD AUTO: 0.3 %
BILIRUB SERPL-MCNC: 0.2 MG/DL (ref 0.1–1)
BILIRUB UR QL STRIP.AUTO: NEGATIVE
BUN SERPL-MCNC: 15 MG/DL (ref 8–23)
CALCIUM SERPL-MCNC: 9.2 MG/DL (ref 8.7–10.5)
CHLORIDE SERPL-SCNC: 105 MMOL/L (ref 95–110)
CLARITY UR: CLEAR
CO2 SERPL-SCNC: 22 MMOL/L (ref 23–29)
COLOR UR AUTO: YELLOW
CREAT SERPL-MCNC: 0.9 MG/DL (ref 0.5–1.4)
ERYTHROCYTE [DISTWIDTH] IN BLOOD BY AUTOMATED COUNT: 12.7 % (ref 11.5–14.5)
ETHANOL SERPL-MCNC: <10 MG/DL
GFR SERPLBLD CREATININE-BSD FMLA CKD-EPI: >60 ML/MIN/1.73/M2
GLUCOSE SERPL-MCNC: 93 MG/DL (ref 70–110)
GLUCOSE UR QL STRIP: NEGATIVE
HCT VFR BLD AUTO: 36.4 % (ref 37–48.5)
HCV AB SERPL QL IA: NEGATIVE
HGB BLD-MCNC: 12.3 GM/DL (ref 12–16)
HGB UR QL STRIP: NEGATIVE
HIV 1+2 AB+HIV1 P24 AG SERPL QL IA: NEGATIVE
HOLD SPECIMEN: NORMAL
HOLD SPECIMEN: NORMAL
HYALINE CASTS UR QL AUTO: 1 /LPF (ref 0–1)
IMM GRANULOCYTES # BLD AUTO: 0 K/UL (ref 0–0.04)
IMM GRANULOCYTES NFR BLD AUTO: 0 % (ref 0–0.5)
KETONES UR QL STRIP: NEGATIVE
LEUKOCYTE ESTERASE UR QL STRIP: ABNORMAL
LYMPHOCYTES # BLD AUTO: 1.54 K/UL (ref 1–4.8)
MCH RBC QN AUTO: 28.5 PG (ref 27–31)
MCHC RBC AUTO-ENTMCNC: 33.8 G/DL (ref 32–36)
MCV RBC AUTO: 84 FL (ref 82–98)
MICROSCOPIC COMMENT: NORMAL
NITRITE UR QL STRIP: NEGATIVE
NUCLEATED RBC (/100WBC) (OHS): 0 /100 WBC
PH UR STRIP: 7 [PH]
PLATELET # BLD AUTO: 212 K/UL (ref 150–450)
PMV BLD AUTO: 9.7 FL (ref 9.2–12.9)
POTASSIUM SERPL-SCNC: 4.1 MMOL/L (ref 3.5–5.1)
PROT SERPL-MCNC: 7.4 GM/DL (ref 6–8.4)
PROT UR QL STRIP: NEGATIVE
RBC # BLD AUTO: 4.32 M/UL (ref 4–5.4)
RBC #/AREA URNS AUTO: 0 /HPF (ref 0–4)
RELATIVE EOSINOPHIL (OHS): 0.9 %
RELATIVE LYMPHOCYTE (OHS): 47.5 % (ref 18–48)
RELATIVE MONOCYTE (OHS): 9.6 % (ref 4–15)
RELATIVE NEUTROPHIL (OHS): 41.7 % (ref 38–73)
SALICYLATES SERPL-MCNC: <5 MG/DL (ref 15–30)
SARS-COV-2 RDRP RESP QL NAA+PROBE: NEGATIVE
SODIUM SERPL-SCNC: 137 MMOL/L (ref 136–145)
SP GR UR STRIP: 1.02
TSH SERPL-ACNC: 1.34 UIU/ML (ref 0.4–4)
UROBILINOGEN UR STRIP-ACNC: NEGATIVE EU/DL
VALPROATE SERPL-MCNC: 75.7 UG/ML (ref 50–100)
WBC # BLD AUTO: 3.24 K/UL (ref 3.9–12.7)
WBC #/AREA URNS AUTO: 2 /HPF (ref 0–5)
YEAST UR QL AUTO: NORMAL /HPF

## 2025-06-09 PROCEDURE — 86803 HEPATITIS C AB TEST: CPT | Performed by: EMERGENCY MEDICINE

## 2025-06-09 PROCEDURE — 81001 URINALYSIS AUTO W/SCOPE: CPT | Performed by: EMERGENCY MEDICINE

## 2025-06-09 PROCEDURE — 85025 COMPLETE CBC W/AUTO DIFF WBC: CPT | Performed by: EMERGENCY MEDICINE

## 2025-06-09 PROCEDURE — 80179 DRUG ASSAY SALICYLATE: CPT | Performed by: EMERGENCY MEDICINE

## 2025-06-09 PROCEDURE — 82077 ASSAY SPEC XCP UR&BREATH IA: CPT | Performed by: EMERGENCY MEDICINE

## 2025-06-09 PROCEDURE — 80164 ASSAY DIPROPYLACETIC ACD TOT: CPT | Performed by: EMERGENCY MEDICINE

## 2025-06-09 PROCEDURE — U0002 COVID-19 LAB TEST NON-CDC: HCPCS | Performed by: EMERGENCY MEDICINE

## 2025-06-09 PROCEDURE — 84443 ASSAY THYROID STIM HORMONE: CPT | Performed by: EMERGENCY MEDICINE

## 2025-06-09 PROCEDURE — 80053 COMPREHEN METABOLIC PANEL: CPT | Performed by: EMERGENCY MEDICINE

## 2025-06-09 PROCEDURE — 87389 HIV-1 AG W/HIV-1&-2 AB AG IA: CPT | Performed by: EMERGENCY MEDICINE

## 2025-06-09 PROCEDURE — 80143 DRUG ASSAY ACETAMINOPHEN: CPT | Performed by: EMERGENCY MEDICINE

## 2025-06-09 PROCEDURE — 80307 DRUG TEST PRSMV CHEM ANLYZR: CPT | Performed by: EMERGENCY MEDICINE

## 2025-06-09 PROCEDURE — 99285 EMERGENCY DEPT VISIT HI MDM: CPT

## 2025-06-09 RX ORDER — ARIPIPRAZOLE 15 MG/1
30 TABLET ORAL
COMMUNITY

## 2025-06-09 RX ORDER — CLONAZEPAM 1 MG/1
1 TABLET ORAL
COMMUNITY

## 2025-06-09 RX ORDER — RISPERIDONE 4 MG/1
4 TABLET ORAL NIGHTLY
COMMUNITY
Start: 2025-01-07

## 2025-06-09 RX ORDER — CLONAZEPAM 0.5 MG/1
TABLET ORAL
COMMUNITY

## 2025-06-09 RX ORDER — BUSPIRONE HYDROCHLORIDE 10 MG/1
10 TABLET ORAL 2 TIMES DAILY
COMMUNITY
Start: 2025-05-14

## 2025-06-09 RX ORDER — TIOTROPIUM BROMIDE AND OLODATEROL 3.124; 2.736 UG/1; UG/1
SPRAY, METERED RESPIRATORY (INHALATION)
COMMUNITY
Start: 2025-04-27

## 2025-06-10 VITALS
OXYGEN SATURATION: 98 % | SYSTOLIC BLOOD PRESSURE: 137 MMHG | RESPIRATION RATE: 16 BRPM | HEIGHT: 60 IN | BODY MASS INDEX: 25.52 KG/M2 | DIASTOLIC BLOOD PRESSURE: 86 MMHG | HEART RATE: 76 BPM | TEMPERATURE: 98 F | WEIGHT: 130 LBS

## 2025-06-10 LAB
AMPHET UR QL SCN: NEGATIVE
BARBITURATE SCN PRESENT UR: NEGATIVE
BENZODIAZ UR QL SCN: NEGATIVE
CANNABINOIDS UR QL SCN: NEGATIVE
COCAINE UR QL SCN: NEGATIVE
CREAT UR-MCNC: 153.1 MG/DL (ref 15–325)
METHADONE UR QL SCN: NEGATIVE
OPIATES UR QL SCN: NEGATIVE
PCP UR QL: NEGATIVE

## 2025-06-10 PROCEDURE — 93010 ELECTROCARDIOGRAM REPORT: CPT | Mod: ,,, | Performed by: INTERNAL MEDICINE

## 2025-06-10 PROCEDURE — 93005 ELECTROCARDIOGRAM TRACING: CPT

## 2025-06-10 NOTE — ED PROVIDER NOTES
"SCRIBE #1 NOTE: I, Hung Hutchinson, am scribing for, and in the presence of, Lucrecia Covington MD. I have scribed the entire note.       History     Chief Complaint   Patient presents with    Paranoid     Per EMS pt was released from [facility].  Per the  the pt is paranoid.  Pt denies SI/HI.  Pt c/o chronic feet pain.     Review of patient's allergies indicates:   Allergen Reactions    Ace inhibitors     Benztropine      Other Reaction(s): tremors,drooling    Carbamazepine Hives     Other reaction(s): Sensitivity: AL;      Fluphenazine hcl Hives     Other reaction(s): Sensitivity: AL;      Haloperidol lactate Hives     Other reaction(s): Sensitivity: AL;      Lithium analogues Hives     Other reaction(s): Sensitivity: AL;      Milk containing products (dairy) Diarrhea     Other reaction(s): Unknown    Risperidone      Other reaction(s): Unknown    Trazodone      Other reaction(s): Unknown    Lurasidone Hives and Rash         History of Present Illness     HPI    6/9/2025, 9:12 PM  History obtained from the patient, medical records, and EMS      History of Present Illness: Linda Gonzalez is a 64 y.o. female patient with a PMHx of Schizophrenia and bipolar disorder who presents to the Emergency Department for evaluation of delusions which began earlier today. Per EMS, pt was recently released from [facility] earlier today. Pt states [facility] sent her to a group home that was "horrible." Pt reports the group home was dirty and contained captives, bugs, cameras in all the rooms and bathrooms, and sensors in the hallways. Pt states she walked out because the door was open, but the owner named EKTA drove her around to confuse her. Pt reports she somehow got to [employer] and called the police to tell them about the group home. Symptoms are constant and moderate in severity. No mitigating or exacerbating factors reported. Associated sxs include paranoia and abdominal pain. Patient denies any fever, " SI, AH, or VH. Pt reports she no longer talks to her family.  No prior Tx specified.  No further complaints or concerns at this time.       Arrival mode: EMS    PCP: Gerald Jaimes MD        Past Medical History:  Past Medical History:   Diagnosis Date    Bipolar 1 disorder     Schizophrenia        Past Surgical History:  History reviewed. No pertinent surgical history.      Family History:  No family history on file.    Social History:  Social History     Tobacco Use    Smoking status: Never    Smokeless tobacco: Never   Substance and Sexual Activity    Alcohol use: Not Currently    Drug use: Never    Sexual activity: Not Currently        Review of Systems     Review of Systems   Constitutional:  Negative for fever.   HENT:  Negative for sore throat.    Respiratory:  Negative for shortness of breath.    Cardiovascular:  Negative for chest pain.   Gastrointestinal:  Positive for abdominal pain. Negative for nausea.   Genitourinary:  Negative for dysuria.   Musculoskeletal:  Negative for back pain.   Skin:  Negative for rash.   Neurological:  Negative for weakness.   Hematological:  Does not bruise/bleed easily.   Psychiatric/Behavioral:  Negative for hallucinations and suicidal ideas.         (+) delusions  (+) paranoia   All other systems reviewed and are negative.       Physical Exam     Initial Vitals   BP Pulse Resp Temp SpO2   06/09/25 2103 06/09/25 2103 06/09/25 2103 06/09/25 2103 06/10/25 0230   125/76 110 16 98.5 °F (36.9 °C) 98 %      MAP       --                 Physical Exam  Nursing Notes and Vital Signs Reviewed.  Constitutional: Patient is in no apparent distress. Well-developed and well-nourished.  Head: Atraumatic. Normocephalic.  Eyes: PERRL. EOM intact. Conjunctivae are not pale. No scleral icterus.  ENT: Mucous membranes are moist. Oropharynx is clear and symmetric.    Neck: Supple. Full ROM. No lymphadenopathy.  Cardiovascular: Regular rate. Regular rhythm. No murmurs, rubs, or gallops. Distal  pulses are 2+ and symmetric.  Pulmonary/Chest: No respiratory distress. Clear to auscultation bilaterally. No wheezing or rales.  Abdominal: Soft and non-distended. There is no tenderness.  No rebound, guarding, or rigidity. Good bowel sounds.  Genitourinary: No CVA tenderness.  Musculoskeletal: Moves all extremities. No obvious deformities. No edema. No calf tenderness.  Skin: Warm and dry.  Neurological:  Alert, awake, and appropriate.  Normal speech.  No acute focal neurological deficits are appreciated.  Psychiatric: Normal affect. Good eye contact. Paranoid behavior. Rambling speech. Flight of ideas.     ED Course   Procedures  ED Vital Signs:  Vitals:    06/09/25 2103 06/10/25 0230   BP: 125/76 137/86   Pulse: 110 76   Resp: 16 16   Temp: 98.5 °F (36.9 °C) 98 °F (36.7 °C)   TempSrc: Oral    SpO2:  98%   Weight: 59 kg (130 lb)    Height: 5' (1.524 m)        Abnormal Lab Results:  Labs Reviewed   COMPREHENSIVE METABOLIC PANEL - Abnormal       Result Value    Sodium 137      Potassium 4.1      Chloride 105      CO2 22 (*)     Glucose 93      BUN 15      Creatinine 0.9      Calcium 9.2      Protein Total 7.4      Albumin 3.5      Bilirubin Total 0.2      ALP 92      AST 15      ALT 12      Anion Gap 10      eGFR >60     URINALYSIS, REFLEX TO URINE CULTURE - Abnormal    Color, UA Yellow      Appearance, UA Clear      pH, UA 7.0      Spec Grav UA 1.020      Protein, UA Negative      Glucose, UA Negative      Ketones, UA Negative      Bilirubin, UA Negative      Blood, UA Negative      Nitrites, UA Negative      Urobilinogen, UA Negative      Leukocyte Esterase, UA Trace (*)    ACETAMINOPHEN LEVEL - Abnormal    Acetaminophen Level <3.0 (*)    SALICYLATE LEVEL - Abnormal    Salicylate Level <5.0 (*)    CBC WITH DIFFERENTIAL - Abnormal    WBC 3.24 (*)     RBC 4.32      HGB 12.3      HCT 36.4 (*)     MCV 84      MCH 28.5      MCHC 33.8      RDW 12.7      Platelet Count 212      MPV 9.7      Nucleated RBC 0      Neut %  "41.7      Lymph % 47.5      Mono % 9.6      Eos % 0.9      Basophil % 0.3      Imm Grans % 0.0      Neut # 1.35 (*)     Lymph # 1.54      Mono # 0.31      Eos # 0.03      Baso # 0.01      Imm Grans # 0.00     HEPATITIS C ANTIBODY - Normal    Hep C Ab Interp Negative     HIV 1 / 2 ANTIBODY - Normal    HIV 1/2 Ag/Ab Negative     DRUG SCREEN PANEL, URINE EMERGENCY - Normal    Benzodiazepine, Urine Negative      Methadone, Urine Negative      Cocaine, Urine Negative      Opiates, Urine Negative      Barbiturates, Urine Negative      Amphetamines, Urine Negative      THC Negative      Phencyclidine, Urine Negative      Urine Creatinine 153.1      Narrative:     This screen includes the following classes of drugs at the listed cut-off:     Benzodiazepines:        200 ng/ml   Methadone:              300 ng/ml   Cocaine metabolite:     300 ng/ml   Opiates:                300 ng/ml   Barbiturates:           200 ng/ml   Amphetamines:           1000 ng/ml   Marijuana metabs (THC): 50 ng/ml   Phencyclidine (PCP):    25 ng/ml     This is a screening test. If results do not correlate with clinical presentation, then a confirmatory send out test is advised.    This report is intended for use in clinical monitoring and management of patients. It is not intended for use in employment related drug testing."   ALCOHOL,MEDICAL (ETHANOL) - Normal    Alcohol, Serum <10     SARS-COV-2 RNA AMPLIFICATION, QUAL - Normal    SARS COV-2 Molecular Negative     VALPROIC ACID - Normal    Valproic Acid 75.7     TSH - Normal    TSH 1.343     HEP C VIRUS HOLD SPECIMEN    Extra Tube Hold for add-ons.     CBC W/ AUTO DIFFERENTIAL    Narrative:     The following orders were created for panel order CBC auto differential.  Procedure                               Abnormality         Status                     ---------                               -----------         ------                     CBC with Differential[[id number]]       Abnormal            " Final result                 Please view results for these tests on the individual orders.   GRE[place] URINE HOLD    Extra Tube Hold for add-ons.     URINALYSIS MICROSCOPIC    RBC, UA 0      WBC, UA 2      Bacteria, UA None      Yeast, UA None      Hyaline Casts, UA 1      Microscopic Comment            All Lab Results:  Results for orders placed or performed during the hospital encounter of 06/09/25   Urinalysis, Reflex to Urine Culture Urine, Clean Catch    Collection Time: 06/09/25  9:31 PM    Specimen: Urine   Result Value Ref Range    Color, UA Yellow Straw, Felisa, Yellow, Light-Orange    Appearance, UA Clear Clear    pH, UA 7.0 5.0 - 8.0    Spec Grav UA 1.020 1.005 - 1.030    Protein, UA Negative Negative    Glucose, UA Negative Negative    Ketones, UA Negative Negative    Bilirubin, UA Negative Negative    Blood, UA Negative Negative    Nitrites, UA Negative Negative    Urobilinogen, UA Negative <2.0 EU/dL    Leukocyte Esterase, UA Trace (A) Negative   Drug screen panel, emergency    Collection Time: 06/09/25  9:31 PM   Result Value Ref Range    Benzodiazepine, Urine Negative Negative    Methadone, Urine Negative Negative    Cocaine, Urine Negative Negative    Opiates, Urine Negative Negative    Barbiturates, Urine Negative Negative    Amphetamines, Urine Negative Negative    THC Negative Negative    Phencyclidine, Urine Negative Negative    Urine Creatinine 153.1 15.0 - 325.0 mg/dL   [place] URINE HOLD    Collection Time: 06/09/25  9:31 PM   Result Value Ref Range    Extra Tube Hold for add-ons.    Urinalysis Microscopic    Collection Time: 06/09/25  9:31 PM   Result Value Ref Range    RBC, UA 0 0 - 4 /HPF    WBC, UA 2 0 - 5 /HPF    Bacteria, UA None None, Rare, Occasional /HPF    Yeast, UA None None /HPF    Hyaline Casts, UA 1 0 - 1 /LPF    Microscopic Comment     Hepatitis C Antibody    Collection Time: 06/09/25  9:40 PM   Result Value Ref Range    Hep C Ab Interp Negative Negative   HCV Virus Hold  Specimen    Collection Time: 06/09/25  9:40 PM   Result Value Ref Range    Extra Tube Hold for add-ons.    HIV 1/2 Ag/Ab (4th Gen)    Collection Time: 06/09/25  9:40 PM   Result Value Ref Range    HIV 1/2 Ag/Ab Negative Negative   Comprehensive metabolic panel    Collection Time: 06/09/25  9:40 PM   Result Value Ref Range    Sodium 137 136 - 145 mmol/L    Potassium 4.1 3.5 - 5.1 mmol/L    Chloride 105 95 - 110 mmol/L    CO2 22 (L) 23 - 29 mmol/L    Glucose 93 70 - 110 mg/dL    BUN 15 8 - 23 mg/dL    Creatinine 0.9 0.5 - 1.4 mg/dL    Calcium 9.2 8.7 - 10.5 mg/dL    Protein Total 7.4 6.0 - 8.4 gm/dL    Albumin 3.5 3.5 - 5.2 g/dL    Bilirubin Total 0.2 0.1 - 1.0 mg/dL    ALP 92 40 - 150 unit/L    AST 15 11 - 45 unit/L    ALT 12 10 - 44 unit/L    Anion Gap 10 8 - 16 mmol/L    eGFR >60 >60 mL/min/1.73/m2   Ethanol    Collection Time: 06/09/25  9:40 PM   Result Value Ref Range    Alcohol, Serum <10 <10 mg/dL   Acetaminophen level    Collection Time: 06/09/25  9:40 PM   Result Value Ref Range    Acetaminophen Level <3.0 (L) 10.0 - 20.0 ug/ml   COVID-19 Rapid Screening    Collection Time: 06/09/25  9:40 PM   Result Value Ref Range    SARS COV-2 Molecular Negative Negative   Salicylate level    Collection Time: 06/09/25  9:40 PM   Result Value Ref Range    Salicylate Level <5.0 (L) 15.0 - 30.0 mg/dL   Valproic Acid    Collection Time: 06/09/25  9:40 PM   Result Value Ref Range    Valproic Acid 75.7 50.0 - 100.0 ug/ml   TSH    Collection Time: 06/09/25  9:40 PM   Result Value Ref Range    TSH 1.343 0.400 - 4.000 uIU/mL   CBC with Differential    Collection Time: 06/09/25  9:40 PM   Result Value Ref Range    WBC 3.24 (L) 3.90 - 12.70 K/uL    RBC 4.32 4.00 - 5.40 M/uL    HGB 12.3 12.0 - 16.0 gm/dL    HCT 36.4 (L) 37.0 - 48.5 %    MCV 84 82 - 98 fL    MCH 28.5 27.0 - 31.0 pg    MCHC 33.8 32.0 - 36.0 g/dL    RDW 12.7 11.5 - 14.5 %    Platelet Count 212 150 - 450 K/uL    MPV 9.7 9.2 - 12.9 fL    Nucleated RBC 0 <=0 /100 WBC     Neut % 41.7 38 - 73 %    Lymph % 47.5 18 - 48 %    Mono % 9.6 4 - 15 %    Eos % 0.9 <=8 %    Basophil % 0.3 <=1.9 %    Imm Grans % 0.0 0.0 - 0.5 %    Neut # 1.35 (L) 1.8 - 7.7 K/uL    Lymph # 1.54 1 - 4.8 K/uL    Mono # 0.31 0.3 - 1 K/uL    Eos # 0.03 <=0.5 K/uL    Baso # 0.01 <=0.2 K/uL    Imm Grans # 0.00 0.00 - 0.04 K/uL       Imaging Results:  Imaging Results    None          The EKG was ordered, reviewed, and independently interpreted by the ED provider.  Interpretation time: 0:48  Rate: 75 BPM  Rhythm: normal sinus rhythm  Interpretation: Nonspecific ST abnormality. No STEMI.         The Emergency Provider reviewed the vital signs and test results, which are outlined above.     ED Discussion     12:15 AM: The PEC hold has been issued by Dr. Covington at this time for the following: being a danger to herself, gravely disabled, and unable to seek voluntary admission.    1:53 AM: Pt has been medically cleared by Dr. Covington at this time. Reassessed pt at this time. Pt is resting comfortably and appears in no acute distress. There are no psychiatric services offered at this facility. D/w pt all pertinent ED information and plan to transfer to psychiatric facility for psychiatric treatment. Pt verbalizes understanding. Patient being transferred by [facility] for ongoing personal protection en route. Pt has been made aware of all risks and benefits associated with transfer, including but not limited to death, MVC, loss of vital signs, and/or permanent disability. Benefits include ability to be treated at an inpatient psychiatric facility. Pt will be transported by personnel trained in CPR and CPI. Patient understands that there could be unforeseen motor vehicle accidents, inclement weather, or loss of vital signs that could result in potential death or permanent disability. All questions and complaints have been addressed at this time. Pt condition is stable at this time and is clear to transfer to psychiatric facility at  this time.          Medical Decision Making  Amount and/or Complexity of Data Reviewed  Labs: ordered. Decision-making details documented in ED Course.                ED Medication(s):  Medications - No data to display    New Prescriptions    No medications on file               Scribe Attestation:   Scribe #1: I performed the above scribed service and the documentation accurately describes the services I performed. I attest to the accuracy of the note.     Attending:   Physician Attestation Statement for Scribe #1: I, Lucrecia Covington MD, personally performed the services described in this documentation, as scribed by Hung Hutchinson, in my presence, and it is both accurate and complete.           Clinical Impression       ICD-10-CM ICD-9-CM   1. Paranoid delusion  F22 297.9   2. Medical clearance for psychiatric admission  Z00.8 V70.8       Disposition:   Disposition: Transferred  Condition: Stable        Lucrecia Covington MD  06/10/25 0317

## 2025-06-11 LAB
OHS QRS DURATION: 88 MS
OHS QTC CALCULATION: 386 MS